# Patient Record
Sex: MALE | Race: WHITE | NOT HISPANIC OR LATINO | URBAN - METROPOLITAN AREA
[De-identification: names, ages, dates, MRNs, and addresses within clinical notes are randomized per-mention and may not be internally consistent; named-entity substitution may affect disease eponyms.]

---

## 2022-04-19 ENCOUNTER — APPOINTMENT (RX ONLY)
Dept: URBAN - METROPOLITAN AREA CLINIC 41 | Facility: CLINIC | Age: 32
Setting detail: DERMATOLOGY
End: 2022-04-19

## 2022-04-19 DIAGNOSIS — M79.2 NEURALGIA AND NEURITIS, UNSPECIFIED: ICD-10-CM

## 2022-04-19 PROCEDURE — 99204 OFFICE O/P NEW MOD 45 MIN: CPT

## 2022-04-19 PROCEDURE — ? PRESCRIPTION

## 2022-04-19 PROCEDURE — ? ADDITIONAL NOTES

## 2022-04-19 PROCEDURE — ? COUNSELING

## 2022-04-19 RX ORDER — GABAPENTIN 100 MG/1
CAPSULE ORAL NIGHTLY
Qty: 14 | Refills: 0 | Status: ERX | COMMUNITY
Start: 2022-04-19

## 2022-04-19 RX ORDER — CLOBETASOL PROPIONATE 0.5 MG/ML
SOLUTION TOPICAL BID
Qty: 50 | Refills: 0 | Status: ERX | COMMUNITY
Start: 2022-04-19

## 2022-04-19 RX ADMIN — GABAPENTIN: 100 CAPSULE ORAL at 00:00

## 2022-04-19 RX ADMIN — CLOBETASOL PROPIONATE: 0.5 SOLUTION TOPICAL at 00:00

## 2022-04-19 NOTE — PROCEDURE: ADDITIONAL NOTES
Detail Level: Simple
Additional Notes: We had a long discussion about his situation.  I have no doubt that he is feeling a nerve sensation that is creating a psychological state where his brain is trying to categorize this, and that often will manifest as \"there are bugs\".  I showed him the 2 pieces of lint or fibers that he was assuming were \"bugs\" with my dermatoscope, and he did admit that these looked like pieces of lint.\\n\\nI think there is mild seborrheic dermatitis on his scalp (one of the places he describes as being the worst), and mild hand dermatitis on the dorsal hands likely because of a lot of hand washing.  We discussed moisturizing heavily multiple times a day, and eliminating hand washing as much as he has been, so that that aspect of this doesn't get worse.  We discussed generally good skin care for skin that has tended to be sensitive.  \\n\\nHe is going to use clobetasol scalp solution twice daily, and take a little gabapentin at bedtime.  We will reassess then.  I told him that I believe him in terms of the sensation he is feeling, but frankly that this isn't an infestation of any type.  I am hopeful that he doesn't develop any further delusion.
Render Risk Assessment In Note?: no

## 2022-04-19 NOTE — HPI: RASH
What Type Of Note Output Would You Prefer (Optional)?: Standard Output
Is The Patient Presenting As Previously Scheduled?: Yes
How Severe Is Your Rash?: mild
Is This A New Presentation, Or A Follow-Up?: Rash
Additional History: Patient has tried ivermectin in march which helped.

## 2022-05-03 ENCOUNTER — APPOINTMENT (RX ONLY)
Dept: URBAN - METROPOLITAN AREA CLINIC 41 | Facility: CLINIC | Age: 32
Setting detail: DERMATOLOGY
End: 2022-05-03

## 2022-05-03 ENCOUNTER — RX ONLY (OUTPATIENT)
Age: 32
Setting detail: RX ONLY
End: 2022-05-03

## 2022-05-03 DIAGNOSIS — L21.8 OTHER SEBORRHEIC DERMATITIS: ICD-10-CM

## 2022-05-03 PROCEDURE — ? COUNSELING

## 2022-05-03 PROCEDURE — ? ADDITIONAL NOTES

## 2022-05-03 PROCEDURE — 99213 OFFICE O/P EST LOW 20 MIN: CPT

## 2022-05-03 PROCEDURE — ? TREATMENT REGIMEN

## 2022-05-03 RX ORDER — CLOBETASOL PROPIONATE 0.5 MG/ML
SOLUTION TOPICAL BID
Qty: 50 | Refills: 3 | Status: ERX

## 2022-05-03 RX ORDER — CLOBETASOL PROPIONATE 0.5 MG/ML
SOLUTION TOPICAL BID
Qty: 50 | Refills: 3 | Status: CANCELLED
Stop reason: SDUPTHER

## 2022-05-03 NOTE — PROCEDURE: TREATMENT REGIMEN
Continue Regimen: Clobetasol 0.05% solution: Apply directly to scalp twice daily.
Detail Level: Simple

## 2022-05-03 NOTE — PROCEDURE: ADDITIONAL NOTES
Additional Notes: He is no longer complaining about any skin sensation anywhere except on his scalp.  I see no evidence of lice visually, and we discussed that a lice infestation doesn't have good and bad days, it is just itchy all the time.  His situation is different...there is intermittent itching.  I advised that he continue using the clobetasol solution when he needs it.  He says that it did help him.  He does not need to continue the gabapentin certainly...he felt like it gave him a lot of nausea.
Detail Level: Simple
Render Risk Assessment In Note?: no